# Patient Record
Sex: MALE | Race: WHITE | NOT HISPANIC OR LATINO | URBAN - METROPOLITAN AREA
[De-identification: names, ages, dates, MRNs, and addresses within clinical notes are randomized per-mention and may not be internally consistent; named-entity substitution may affect disease eponyms.]

---

## 2018-12-07 ENCOUNTER — APPOINTMENT (RX ONLY)
Dept: URBAN - METROPOLITAN AREA CLINIC 125 | Facility: CLINIC | Age: 83
Setting detail: DERMATOLOGY
End: 2018-12-07

## 2018-12-07 DIAGNOSIS — S0182XA OPEN WOUND OF OTHER AND MULTIPLE SITES OF FACE, COMPLICATED: ICD-10-CM

## 2018-12-07 PROBLEM — J44.9 CHRONIC OBSTRUCTIVE PULMONARY DISEASE, UNSPECIFIED: Status: ACTIVE | Noted: 2018-12-07

## 2018-12-07 PROBLEM — T07.XXXA UNSPECIFIED MULTIPLE INJURIES, INITIAL ENCOUNTER: Status: ACTIVE | Noted: 2018-12-07

## 2018-12-07 PROBLEM — H91.90 UNSPECIFIED HEARING LOSS, UNSPECIFIED EAR: Status: ACTIVE | Noted: 2018-12-07

## 2018-12-07 PROBLEM — Z92.3 PERSONAL HISTORY OF IRRADIATION: Status: ACTIVE | Noted: 2018-12-07

## 2018-12-07 PROCEDURE — ? REFERRAL

## 2018-12-07 PROCEDURE — ? COUNSELING

## 2018-12-07 PROCEDURE — ? RECORDS REQUESTED

## 2018-12-07 PROCEDURE — ? ORDER TESTS

## 2018-12-07 PROCEDURE — ? TREATMENT REGIMEN

## 2018-12-07 PROCEDURE — 99202 OFFICE O/P NEW SF 15 MIN: CPT

## 2018-12-07 NOTE — PROCEDURE: RECORDS REQUESTED
Providers To Request Records From: Shar Gayle PA-C
Preface: I requested the records from the following providers.
Detail Level: Zone

## 2018-12-07 NOTE — PROCEDURE: ORDER TESTS
Bill For Surgical Tray: no
Expected Date Of Service: 12/07/2018
Lab Facility: [id number]
Billing Type: [employer]
Performing Laboratory: [id number]

## 2025-03-04 NOTE — HPI: SKIN LESION
Call Center TCM Note      Flowsheet Row Responses   [facility] patient discharged from? [facility]   Does the patient have one of the following disease processes/diagnoses(primary or secondary)? Other   TCM attempt successful? Yes   Call start time 1108   Call end time 1112   Discharge diagnosis *Influenza   Meds reviewed with patient/caregiver? Yes   Prescription comments states he  is going to  her medications later today   Comments Hospital follow up appt with SANDRO Charlton at PCP office for 3/7.   Does the patient have an appointment with their PCP within 7-14 days of discharge? Yes   Has home health visited the patient within 72 hours of discharge? N/A   What DME was ordered? [employer] nebulizer and portable O2   Has all DME been delivered? Yes   DME comments 4L of O2- states O2 sats are 97% on 4L of O2, advised if O2 sats drop below 90% while wearing O2 to seek urgent care, patient voiced understanding.   Psychosocial issues? No   Did the patient receive a copy of their discharge instructions? Yes   Nursing interventions Reviewed instructions with patient   What is the patient's perception of their health status since discharge? Improving   Is the patient/caregiver able to teach back signs and symptoms related to disease process for when to call PCP? Yes   Is the patient/caregiver able to teach back signs and symptoms related to disease process for when to call 911? Yes   If the patient is a current smoker, are they able to teach back resources for cessation? Not a smoker  [states she no longer is smoking]   TCM call completed? Yes   Wrap up additional comments Doing well, denies any questions or concerns, confirmed hospital follow up appt with PCP office for 3/7, discussed 24/7 nurse call line.   Call end time 1112   Would this patient benefit from a Referral to Amb Social Work? No   Is the patient interested in additional calls from an ambulatory ? Brittany Yadav 
How Severe Is Your Skin Lesion?: mild
Has Your Skin Lesion Been Treated?: been treated
JOLEEN Yeh RN    3/4/2025, 11:12 EST        
Is This A New Presentation, Or A Follow-Up?: Skin Lesions